# Patient Record
Sex: MALE | Race: WHITE | NOT HISPANIC OR LATINO | URBAN - METROPOLITAN AREA
[De-identification: names, ages, dates, MRNs, and addresses within clinical notes are randomized per-mention and may not be internally consistent; named-entity substitution may affect disease eponyms.]

---

## 2017-09-08 ENCOUNTER — OFFICE VISIT - HEALTHEAST (OUTPATIENT)
Dept: SURGERY | Facility: CLINIC | Age: 57
End: 2017-09-08

## 2017-09-08 DIAGNOSIS — K40.20 BILATERAL INGUINAL HERNIA WITHOUT OBSTRUCTION OR GANGRENE, RECURRENCE NOT SPECIFIED: ICD-10-CM

## 2017-09-08 ASSESSMENT — MIFFLIN-ST. JEOR: SCORE: 1832.3

## 2017-09-19 ENCOUNTER — AMBULATORY - HEALTHEAST (OUTPATIENT)
Dept: SURGERY | Facility: CLINIC | Age: 57
End: 2017-09-19

## 2017-10-03 ENCOUNTER — OFFICE VISIT - HEALTHEAST (OUTPATIENT)
Dept: FAMILY MEDICINE | Facility: CLINIC | Age: 57
End: 2017-10-03

## 2017-10-03 ENCOUNTER — AMBULATORY - HEALTHEAST (OUTPATIENT)
Dept: FAMILY MEDICINE | Facility: CLINIC | Age: 57
End: 2017-10-03

## 2017-10-03 DIAGNOSIS — Z01.818 PREOPERATIVE EVALUATION TO RULE OUT SURGICAL CONTRAINDICATION: ICD-10-CM

## 2017-10-03 ASSESSMENT — MIFFLIN-ST. JEOR: SCORE: 1841.38

## 2017-10-04 ENCOUNTER — COMMUNICATION - HEALTHEAST (OUTPATIENT)
Dept: FAMILY MEDICINE | Facility: CLINIC | Age: 57
End: 2017-10-04

## 2017-10-05 ENCOUNTER — RECORDS - HEALTHEAST (OUTPATIENT)
Dept: ADMINISTRATIVE | Facility: OTHER | Age: 57
End: 2017-10-05

## 2017-12-22 ENCOUNTER — COMMUNICATION - HEALTHEAST (OUTPATIENT)
Dept: SCHEDULING | Facility: CLINIC | Age: 57
End: 2017-12-22

## 2021-05-31 VITALS — BODY MASS INDEX: 26.95 KG/M2 | WEIGHT: 210 LBS | HEIGHT: 74 IN

## 2021-05-31 VITALS — WEIGHT: 212 LBS | HEIGHT: 74 IN | BODY MASS INDEX: 27.21 KG/M2

## 2021-06-12 NOTE — PROGRESS NOTES
"I was asked to consult on this pt by No Primary Care Provider for evaluation a hernia.    HPI:  This is a 56 y.o. male here today with concerns of pain and bulging in his right groin. He has noted this for the past 3 week(s). The discomfort he is experiencing is a deep gnawing pain that is worse toward the end of the day.  He has noted that it grows if he is on his feet a lot.  He is able to reduce the hernia on his own.      Allergies:Review of patient's allergies indicates no known allergies.    Past Medical History:   Diagnosis Date     Hernia 09/08/2017    inguinal       Past Surgical History:   Procedure Laterality Date     HERNIA REPAIR Left     as infant     VARICOCELE EXCISION  1983     WISDOM TOOTH EXTRACTION         CURRENT MEDS:  Current Outpatient Prescriptions   Medication Sig Dispense Refill     fluticasone (FLONASE) 50 mcg/actuation nasal spray INSTILL 1 TO 2 SPRAY IN EACH NOSTRIL ONCE DAILY. 48 g 2     No current facility-administered medications for this visit.        History reviewed. No pertinent family history.     reports that he has never smoked. He has never used smokeless tobacco. He reports that he drinks about 2.4 oz of alcohol per week  He reports that he does not use illicit drugs.    Review of Systems -   10 point Review of systems is negative except for; as mentioned above in HPI and PMHx    /79 (Patient Site: Right Arm, Patient Position: Sitting, Cuff Size: Adult Regular)  Pulse (!) 57  Ht 6' 2\" (1.88 m)  Wt 210 lb (95.3 kg)  SpO2 98%  BMI 26.96 kg/m2  Body mass index is 26.96 kg/(m^2).    EXAM:  GENERAL: Well developed male  HEENT: EOMI, Anicteric Sclera  NECK:  No masses, good flexion and extention of the neck  CARDIAC: RRR w/out murmur   CHEST/LUNG: Clear  ABDOMEN: bilateral inguinal hernia. Left ing scar.   GENITAL: Both testicles descended without masses  NEURO: He is ambulatory with good strength in both legs.    IMAGES: -    Assessment/Plan: Pt with a bilateral " inguinal hernia. I discussed this at length with him.  I went over conservative management as well as surgical treatment for hernias.   I would reccomend a laparoscopic inguinal hernia repair, understanding the possibility of converting to an open operation.   I went over the small risks of surgery including but not limited to bleeding and infection. I discussed the expected recovery time as well. We will schedule this hernia repair at his earliest convenience.    Brijesh Hodges MD  [facility]  [phone]

## 2021-06-13 NOTE — PROGRESS NOTES
"     Subjective:     Romeo is a 56 y.o. male here for a Family Medicine pre-operative consultation. The exam is requested by Dr. Keen in preparation for Bilateral inguinal hernia repair to be performed at [facility] on October 5 2017. Today s examination on 10/03/17 is done to review the underlying surgical condition, clear for anesthesia, and review medical problems with appropriate changes in medications.    Romeo has tolerated previous surgeries well without bleeding or anesthesia difficulty.      Review of Systems  Constitutional: negative for anorexia, fatigue and malaise  Eyes: negative for redness  Ears, nose, mouth, throat, and face: negative for earaches, epistaxis, nasal congestion and sore throat  Respiratory: negative for cough, dyspnea on exertion and wheezing  Cardiovascular: negative for chest pain, fatigue, irregular heart beat, near-syncope, orthopnea, palpitations, paroxysmal nocturnal dyspnea and syncope  Gastrointestinal: negative for abdominal pain, change in bowel habits, constipation, diarrhea, dyspepsia, dysphagia, melena, nausea, odynophagia and vomiting  Genitourinary:negative for genital lesions  Hematologic/lymphatic: negative for easy bruising and lymphadenopathy  Musculoskeletal:negative for arthralgias, bone pain, myalgias and stiff joints  Neurological: negative for coordination problems, dizziness, gait problems, headaches, tremors, vertigo and weakness  Behavioral/Psych: negative for anxiety, depression, fatigue and irritability  Endocrine: negative for temperature intolerance  Allergic/Immunologic: negative for urticaria      Objective:      /80 (Patient Site: Right Arm)  Pulse 60  Temp 98  F (36.7  C) (Oral)   Resp 13  Ht 6' 2\" (1.88 m)  Wt 212 lb (96.2 kg)  BMI 27.22 kg/m2    General Appearance:    Alert, cooperative, no distress, appears stated age   Head:    Normocephalic, without obvious abnormality, atraumatic   Eyes:    PERRL, conjunctiva/corneas " clear, EOM's intact, fundi     benign, both eyes        Ears:    Normal TM's and external ear canals, both ears   Nose:   Nares normal, septum midline, mucosa normal, no drainage    or sinus tenderness   Throat:   Lips, mucosa, and tongue normal; teeth and gums normal   Neck:   Supple, symmetrical, trachea midline, no adenopathy;        thyroid:  No enlargement/tenderness/nodules; no carotid    bruit or JVD   Back:     Symmetric, no curvature, ROM normal, no CVA tenderness   Lungs:     Clear to auscultation bilaterally, respirations unlabored   Chest wall:    No tenderness or deformity   Heart:    Regular rate and rhythm, S1 and S2 normal, no murmur, rub   or gallop   Abdomen:     Soft, non-tender, bowel sounds active all four quadrants,     no masses, no organomegaly, bilateral inguinal hernia, easily reducible.   Musculoskeletal:   no limitation of range of motion, no joint tenderness    Extremities:   Extremities normal, atraumatic, no cyanosis or edema   Pulses:   2+ and symmetric all extremities   Skin:   Skin color, texture, turgor normal, no rashes or lesions   Lymph nodes:   Cervical, supraclavicular, and axillary nodes normal   Neurologic:   CNII-XII intact. Normal strength, sensation and reflexes       throughout     Past Medical History:   Diagnosis Date     Hernia 09/08/2017    inguinal     Past Surgical History:   Procedure Laterality Date     HERNIA REPAIR Left     as infant     VARICOCELE EXCISION  1983     WISDOM TOOTH EXTRACTION         Current Outpatient Prescriptions:      fluticasone (FLONASE) 50 mcg/actuation nasal spray, INSTILL 1 TO 2 SPRAY IN EACH NOSTRIL ONCE DAILY., Disp: 48 g, Rfl: 2  No Known Allergies   reports that he has never smoked. He has never used smokeless tobacco. He reports that he drinks about 2.4 oz of alcohol per week  He reports that he does not use illicit drugs.       Predictors of intubation difficulty:   Morbid obesity? no}   Neck range of motion: normal   Dentition: No  chipped, loose, or missing teeth.      Lab Review   No visits with results within 2 Month(s) from this visit.  Latest known visit with results is:    Orders Only on 09/08/2014   Component Date Value     Varicella Zoster Immune * 09/08/2014 Immune          Assessment:        56 y.o. male with planned surgery as above.    Known risk factors for perioperative complications: None    Difficulty with intubation is not anticipated.    Cardiac Risk Estimation: low    Current medications which may produce withdrawal symptoms if withheld perioperatively: none       Plan:     1. Preoperative workup as follows hemoglobin, hematocrit, electrolytes, creatinine, glucose, liver function studies.  2. Change in medication regimen before surgery: not applicable, not on any medications.  3. Reviewed risks (not limited to bleeding,infection,pain,small bowel perforation,un-anticipated response to anesthesia ecc) and benefits (diagnostic and therapeutic) of surgery with patient, he understands the risks of the procedure and would like to proceed.  Patient's active problems diagnostically and therapeutically optimized for planned procedure with, Local or General anesthesia    Recent Results (from the past 240 hour(s))   HM2(CBC w/o Differential)   Result Value Ref Range    WBC 4.3 4.0 - 11.0 thou/uL    RBC 4.79 4.40 - 6.20 mill/uL    Hemoglobin 14.9 14.0 - 18.0 g/dL    Hematocrit 44.5 40.0 - 54.0 %    MCV 93 80 - 100 fL    MCH 31.2 27.0 - 34.0 pg    MCHC 33.6 32.0 - 36.0 g/dL    RDW 12.8 11.0 - 14.5 %    Platelets 162 140 - 440 thou/uL    MPV 7.9 7.0 - 10.0 fL       Pepe Salmeron MD  [facility].  [address] [place] [address].  [place], [place], [address]  Ph:[phone]  Fax:[phone]

## 2021-06-13 NOTE — PROGRESS NOTES
Romeo is scheduled for bilateral inguinal hernia repair with Dr. Hodges on 10/5/17 at [facility]. Patient was given instructions of arrival time, need a , pre-op physical within 30days and NPO after midnight. Patient verbalized understanding.     Emily Colon CMA  Physician    [facility]   [phone]

## 2025-02-19 ENCOUNTER — OFFICE VISIT (OUTPATIENT)
Dept: SURGERY | Facility: CLINIC | Age: 65
End: 2025-02-19
Payer: COMMERCIAL

## 2025-02-19 VITALS
SYSTOLIC BLOOD PRESSURE: 120 MMHG | DIASTOLIC BLOOD PRESSURE: 72 MMHG | HEIGHT: 74 IN | BODY MASS INDEX: 26.31 KG/M2 | WEIGHT: 205 LBS

## 2025-02-19 DIAGNOSIS — M70.21 OLECRANON BURSITIS OF RIGHT ELBOW: Primary | ICD-10-CM

## 2025-02-19 PROCEDURE — 99203 OFFICE O/P NEW LOW 30 MIN: CPT | Performed by: SURGERY

## 2025-02-19 RX ORDER — TAMSULOSIN HYDROCHLORIDE 0.4 MG/1
CAPSULE ORAL
COMMUNITY
Start: 2025-01-02

## 2025-02-19 NOTE — PROGRESS NOTES
This is a consultation from No Ref-Primary, Physician to address a subcutaneous mass.    HPI: Pt is here with concerns about a subcutaneous mass located on his Right Elbow.  It has been present for years but in the last few weeks it became more swollen and red and somewhat painful.  He treated this with Ibuprofen and Ice.  It has improved but he still has considerable swelling with his Right Elbow.   This lesion is mildly tender.  The lesion has not drained.    Allergies:Patient has no known allergies.    No past medical history on file.    Past Surgical History:   Procedure Laterality Date    HERNIA REPAIR Left     as infant    VARICOCELE EXCISION  1983    WISDOM TOOTH EXTRACTION         REVIEW OF SYSTEMS:  10 point ROS is negative except for; as mentioned above.    CURRENT MEDS:    Current Outpatient Medications:     fluticasone (FLONASE) 50 mcg/actuation nasal spray, [FLUTICASONE (FLONASE) 50 MCG/ACTUATION NASAL SPRAY] INSTILL 1 TO 2 SPRAY IN EACH NOSTRIL ONCE DAILY., Disp: 48 g, Rfl: 2    There were no vitals taken for this visit.  There is no height or weight on file to calculate BMI.    EXAM:  GENERAL:Well developed he appears his stated age  HEAD & NECK: Extraocular motions intact, anicteric sclera,  ABDOMEN: Soft and nondistended, positive bowel sounds  LUNGS:  CTA  HEART:  RRR  EXTREMITIES: He has a swollen area over his Right Elbow.  He has full mobility,         Assessment/Plan: The pt has a  4 cm  subcutaneous lesion located over the Right Elbow.  This look consistent with an Olecranon Bursitis  vs  an enlarges Olecranon Bursa.  I believe this lesion is associated with the elbow and because of that I would recommend an evaluation by an orthopedic surgeon.  I think this most likely is associated with the olecranon bursa and with that I did not want to treated like a standard subcutaneous mass.  We will help Mr. Noonan set up a consultation with orthopedic surgery.        Brijesh Hodges MD ,MD  [phone]  [facility] Department of Surgery

## 2025-02-19 NOTE — LETTER
2/19/2025      Romeo Noonan  [address]  [place] [address]      Dear Colleague,    Thank you for referring your patient, Romeo Noonan, to the [facility] [place]. Please see a copy of my visit note below.    This is a consultation from No Ref-Primary, Physician to address a subcutaneous mass.    HPI: Pt is here with concerns about a subcutaneous mass located on his Right Elbow.  It has been present for years but in the last few weeks it became more swollen and red and somewhat painful.  He treated this with Ibuprofen and Ice.  It has improved but he still has considerable swelling with his Right Elbow.   This lesion is mildly tender.  The lesion has not drained.    Allergies:Patient has no known allergies.    No past medical history on file.    Past Surgical History:   Procedure Laterality Date     HERNIA REPAIR Left     as infant     VARICOCELE EXCISION  1983     WISDOM TOOTH EXTRACTION         REVIEW OF SYSTEMS:  10 point ROS is negative except for; as mentioned above.    CURRENT MEDS:    Current Outpatient Medications:      fluticasone (FLONASE) 50 mcg/actuation nasal spray, [FLUTICASONE (FLONASE) 50 MCG/ACTUATION NASAL SPRAY] INSTILL 1 TO 2 SPRAY IN EACH NOSTRIL ONCE DAILY., Disp: 48 g, Rfl: 2    There were no vitals taken for this visit.  There is no height or weight on file to calculate BMI.    EXAM:  GENERAL:Well developed he appears his stated age  HEAD & NECK: Extraocular motions intact, anicteric sclera,  ABDOMEN: Soft and nondistended, positive bowel sounds  LUNGS:  CTA  HEART:  RRR  EXTREMITIES: He has a swollen area over his Right Elbow.  He has full mobility,         Assessment/Plan: The pt has a  4 cm  subcutaneous lesion located over the Right Elbow.  This look consistent with an Olecranon Bursitis  vs  an enlarges Olecranon Bursa.  I believe this lesion is associated with the elbow and because of that I would recommend an evaluation by an  orthopedic surgeon.  I think this most likely is associated with the olecranon bursa and with that I did not want to treated like a standard subcutaneous mass.  We will help Mr. Noonan set up a consultation with orthopedic surgery.        Brijesh Hodges MD ,MD  [phone]  [facility] Department of Surgery       Again, thank you for allowing me to participate in the care of your patient.        Sincerely,        Brijesh Hodges MD    Electronically signed

## 2025-02-20 ENCOUNTER — DOCUMENTATION ONLY (OUTPATIENT)
Dept: SURGERY | Facility: CLINIC | Age: 65
End: 2025-02-20
Payer: COMMERCIAL

## 2025-02-20 NOTE — PROGRESS NOTES
On February 19th 2025, I called [facility] with urgent care in [place] to schedule an appointment for Romeo Noonan to be seen on February 21 for his elbow per Brijesh Silva. The patient was to be seen @ 10:30 AM for a 10:15 AM check in time. I called the patient and inform them of the appointment. The patient was driving and requested I send the appointment details via email because [facility]. I wrote an email to the patient with the appointment details along with the address of [facility] with urgent care. I provided the facility phone number: [phone], if they would like to change, cancel, or reschedule his appointment with them.                 Timur Schwarz, [facility]  [address] [place] [address], [address]  [place] [address]  Phone: [phone]  Fax: [phone]

## 2025-02-21 ENCOUNTER — TRANSFERRED RECORDS (OUTPATIENT)
Dept: HEALTH INFORMATION MANAGEMENT | Facility: CLINIC | Age: 65
End: 2025-02-21
Payer: COMMERCIAL